# Patient Record
Sex: FEMALE | Race: OTHER | NOT HISPANIC OR LATINO
[De-identification: names, ages, dates, MRNs, and addresses within clinical notes are randomized per-mention and may not be internally consistent; named-entity substitution may affect disease eponyms.]

---

## 2020-12-14 ENCOUNTER — APPOINTMENT (OUTPATIENT)
Dept: RHEUMATOLOGY | Facility: CLINIC | Age: 50
End: 2020-12-14
Payer: COMMERCIAL

## 2020-12-14 VITALS
BODY MASS INDEX: 26.52 KG/M2 | TEMPERATURE: 97.2 F | WEIGHT: 165 LBS | OXYGEN SATURATION: 99 % | SYSTOLIC BLOOD PRESSURE: 127 MMHG | RESPIRATION RATE: 16 BRPM | HEART RATE: 68 BPM | DIASTOLIC BLOOD PRESSURE: 84 MMHG | HEIGHT: 66 IN

## 2020-12-14 DIAGNOSIS — G43.709 CHRONIC MIGRAINE W/OUT AURA, NOT INTRACTABLE, W/OUT STATUS MIGRAINOSUS: ICD-10-CM

## 2020-12-14 DIAGNOSIS — R42 DIZZINESS AND GIDDINESS: ICD-10-CM

## 2020-12-14 DIAGNOSIS — M25.50 PAIN IN UNSPECIFIED JOINT: ICD-10-CM

## 2020-12-14 DIAGNOSIS — E11.9 TYPE 2 DIABETES MELLITUS W/OUT COMPLICATIONS: ICD-10-CM

## 2020-12-14 DIAGNOSIS — Z78.9 OTHER SPECIFIED HEALTH STATUS: ICD-10-CM

## 2020-12-14 DIAGNOSIS — R79.89 OTHER SPECIFIED ABNORMAL FINDINGS OF BLOOD CHEMISTRY: ICD-10-CM

## 2020-12-14 DIAGNOSIS — I10 ESSENTIAL (PRIMARY) HYPERTENSION: ICD-10-CM

## 2020-12-14 DIAGNOSIS — J45.20 MILD INTERMITTENT ASTHMA, UNCOMPLICATED: ICD-10-CM

## 2020-12-14 DIAGNOSIS — R13.10 DYSPHAGIA, UNSPECIFIED: ICD-10-CM

## 2020-12-14 DIAGNOSIS — E78.5 HYPERLIPIDEMIA, UNSPECIFIED: ICD-10-CM

## 2020-12-14 PROBLEM — Z00.00 ENCOUNTER FOR PREVENTIVE HEALTH EXAMINATION: Status: ACTIVE | Noted: 2020-12-14

## 2020-12-14 PROCEDURE — 99204 OFFICE O/P NEW MOD 45 MIN: CPT

## 2020-12-14 PROCEDURE — 99072 ADDL SUPL MATRL&STAF TM PHE: CPT

## 2020-12-14 RX ORDER — LISINOPRIL 10 MG/1
10 TABLET ORAL DAILY
Qty: 30 | Refills: 2 | Status: ACTIVE | COMMUNITY
Start: 2020-12-14

## 2020-12-14 RX ORDER — METFORMIN HYDROCHLORIDE 500 MG/1
500 TABLET, COATED ORAL
Qty: 60 | Refills: 0 | Status: ACTIVE | COMMUNITY
Start: 2020-12-14

## 2020-12-14 RX ORDER — ALBUTEROL SULFATE 2.5 MG/3ML
(2.5 MG/3ML) SOLUTION RESPIRATORY (INHALATION)
Refills: 0 | Status: ACTIVE | COMMUNITY
Start: 2020-12-14

## 2020-12-14 RX ORDER — COLD-HOT PACK
125 MCG EACH MISCELLANEOUS DAILY
Qty: 30 | Refills: 2 | Status: ACTIVE | COMMUNITY
Start: 2020-12-14 | End: 1900-01-01

## 2020-12-14 RX ORDER — SITAGLIPTIN 100 MG/1
100 TABLET, FILM COATED ORAL DAILY
Refills: 0 | Status: ACTIVE | COMMUNITY
Start: 2020-12-14

## 2020-12-14 RX ORDER — EZETIMIBE 10 MG/1
10 TABLET ORAL
Refills: 0 | Status: ACTIVE | COMMUNITY
Start: 2020-12-14

## 2020-12-18 LAB
25(OH)D3 SERPL-MCNC: 37.2 NG/ML
ALBUMIN SERPL ELPH-MCNC: 4.8 G/DL
ALP BLD-CCNC: 102 U/L
ALT SERPL-CCNC: 25 U/L
ANA SER IF-ACNC: NEGATIVE
ANION GAP SERPL CALC-SCNC: 13 MMOL/L
AST SERPL-CCNC: 16 U/L
BASOPHILS # BLD AUTO: 0.03 K/UL
BASOPHILS NFR BLD AUTO: 0.4 %
BILIRUB SERPL-MCNC: 0.6 MG/DL
BUN SERPL-MCNC: 23 MG/DL
CALCIUM SERPL-MCNC: 11 MG/DL
CCP AB SER IA-ACNC: <8 UNITS
CHLORIDE SERPL-SCNC: 102 MMOL/L
CK SERPL-CCNC: 79 U/L
CO2 SERPL-SCNC: 29 MMOL/L
CREAT SERPL-MCNC: 1.21 MG/DL
CRP SERPL-MCNC: 2.24 MG/DL
DEPRECATED KAPPA LC FREE/LAMBDA SER: 0.99 RATIO
ENA SS-A AB SER IA-ACNC: <0.2 AL
ENA SS-B AB SER IA-ACNC: <0.2 AL
EOSINOPHIL # BLD AUTO: 0.06 K/UL
EOSINOPHIL NFR BLD AUTO: 0.7 %
ERYTHROCYTE [SEDIMENTATION RATE] IN BLOOD BY WESTERGREN METHOD: 33 MM/HR
ESTIMATED AVERAGE GLUCOSE: 143 MG/DL
GLUCOSE SERPL-MCNC: 143 MG/DL
HBA1C MFR BLD HPLC: 6.6 %
HCT VFR BLD CALC: 45 %
HGB BLD-MCNC: 14.6 G/DL
IGA SER QL IEP: 415 MG/DL
IGG SER QL IEP: 1075 MG/DL
IGM SER QL IEP: 163 MG/DL
IMM GRANULOCYTES NFR BLD AUTO: 0.2 %
KAPPA LC CSF-MCNC: 2.07 MG/DL
KAPPA LC SERPL-MCNC: 2.05 MG/DL
LYMPHOCYTES # BLD AUTO: 2.77 K/UL
LYMPHOCYTES NFR BLD AUTO: 34.2 %
MAN DIFF?: NORMAL
MCHC RBC-ENTMCNC: 29.9 PG
MCHC RBC-ENTMCNC: 32.4 GM/DL
MCV RBC AUTO: 92.2 FL
MONOCYTES # BLD AUTO: 0.43 K/UL
MONOCYTES NFR BLD AUTO: 5.3 %
NEUTROPHILS # BLD AUTO: 4.79 K/UL
NEUTROPHILS NFR BLD AUTO: 59.2 %
PLATELET # BLD AUTO: 306 K/UL
POTASSIUM SERPL-SCNC: 4.2 MMOL/L
PROT SERPL-MCNC: 7.7 G/DL
RBC # BLD: 4.88 M/UL
RBC # FLD: 13.3 %
RF+CCP IGG SER-IMP: NEGATIVE
RHEUMATOID FACT SER QL: 11 IU/ML
SODIUM SERPL-SCNC: 144 MMOL/L
THYROGLOB AB SERPL-ACNC: <20 IU/ML
THYROPEROXIDASE AB SERPL IA-ACNC: 13 IU/ML
WBC # FLD AUTO: 8.1 K/UL

## 2020-12-21 LAB — HLA-B27 RELATED AG QL: NEGATIVE

## 2021-02-08 ENCOUNTER — APPOINTMENT (OUTPATIENT)
Dept: RHEUMATOLOGY | Facility: CLINIC | Age: 51
End: 2021-02-08

## 2021-02-08 NOTE — REVIEW OF SYSTEMS
[Fever] : no fever [Chills] : no chills [Feeling Poorly] : not feeling poorly [Feeling Tired] : feeling tired [Recent Weight Gain (___ Lbs)] : no recent weight gain [Recent Weight Loss (___ Lbs)] : no recent weight loss [Eye Pain] : no eye pain [Red Eyes] : eyes not red [Eyesight Problems] : eyesight problems [Discharge From Eyes] : no purulent discharge from the eyes [Dry Eyes] : dryness of the eyes [Eyes Itch] : no itching of the eyes [Arthralgias] : arthralgias [Joint Pain] : joint pain [Joint Swelling] : joint swelling [Joint Stiffness] : joint stiffness [Limb Pain] : no limb pain [Limb Swelling] : no limb swelling [As Noted in HPI] : as noted in HPI [Skin Lesions] : skin lesion [Skin Wound] : no skin wound [Itching] : no itching [Change In A Mole] : no change in a mole [Breast Pain] : no breast pain [Breast Lump] : no breast lump [Negative] : Heme/Lymph

## 2021-02-08 NOTE — PHYSICAL EXAM
[General Appearance - Alert] : alert [General Appearance - In No Acute Distress] : in no acute distress [Sclera] : the sclera and conjunctiva were normal [PERRL With Normal Accommodation] : pupils were equal in size, round, and reactive to light [Extraocular Movements] : extraocular movements were intact [Outer Ear] : the ears and nose were normal in appearance [Oropharynx] : the oropharynx was normal [Neck Appearance] : the appearance of the neck was normal [Neck Cervical Mass (___cm)] : no neck mass was observed [Jugular Venous Distention Increased] : there was no jugular-venous distention [Thyroid Diffuse Enlargement] : the thyroid was not enlarged [Thyroid Nodule] : there were no palpable thyroid nodules [] : no respiratory distress [Auscultation Breath Sounds / Voice Sounds] : lungs were clear to auscultation bilaterally [Heart Rate And Rhythm] : heart rate was normal and rhythm regular [Heart Sounds] : normal S1 and S2 [Heart Sounds Gallop] : no gallops [Murmurs] : no murmurs [Heart Sounds Pericardial Friction Rub] : no pericardial rub [Full Pulse] : the pedal pulses are present [Edema] : there was no peripheral edema [Cervical Lymph Nodes Enlarged Posterior Bilaterally] : posterior cervical [Cervical Lymph Nodes Enlarged Anterior Bilaterally] : anterior cervical [Supraclavicular Lymph Nodes Enlarged Bilaterally] : supraclavicular [Axillary Lymph Nodes Enlarged Bilaterally] : axillary [FreeTextEntry1] : Two scaly circumscribed areas on the distal shin  [No Focal Deficits] : no focal deficits [Oriented To Time, Place, And Person] : oriented to person, place, and time [Impaired Insight] : insight and judgment were intact [Affect] : the affect was normal

## 2021-02-08 NOTE — ASSESSMENT
[FreeTextEntry1] : 49 year old female here for evaluation of arthralgias \par \par 1. Arthralgias: likely has FBM and OA as well as diabetic arthropathy given R middle digit trigger finger. However, will also evaluate for RA since she has some inflammatory joint symptoms with > 1 hour of morning symptoms and subjective swelling in the MCP joints. Labs ordered today. Consider hands, wrists and C-spine x-rays in the future. \par \par 2. Dysphagia: f/u EGD and barium swallow studies \par \par 3. Diabetes: A1C ordered today. \par \par 4. Rash: appears to be dermatitis rather than psoriasis. No other areas of rashes or skin lesions. Monitor for now. \par \par 5. Bone health: Ca+D as needed. DEXA not needed at this time. \par \par Follow up in 6 weeks

## 2021-02-08 NOTE — HISTORY OF PRESENT ILLNESS
[FreeTextEntry1] : 49 year old female here for evaluation of joint pains\par \par When she was 28 years old she was told that she may have RA. Had joint pains in the hands a/w morning stiffness. Has type 2 DM, HTN and dyslipidemia. 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 \par \par Currently c/o severe pain in the hand joints - MCPs and PIPs and particularly in the R middle finger - received cortisone shot in that finger for trigger finger from the hand surgeon (helped a little but not a lot). They do swell from time to time. Also has pain in the knees but no swelling. Cold and rainy weather makes symptoms worse. Has psoriasis in the distal shin of the R leg (does not get better with topical therapy). No other skin rashes or lesions. Noticing some difficulty swallowing with choking on solids and liquids - EGD and barium swallow pending. Also c/o neck pain radiating into the shoulders as well as into the occiput. Sometimes gets low back pain as well but not as bad as her neck pain. H/o migraines for many years - feels that they are getting worse. Has vertigo attacks as well. \par \par Has chronic dry eyes + \par \par HCM: Colonoscopy: pending. Mammogram: last done 2 years ago. PAP: normal\par \par Ob/Gyn H: A2 -  one spontaneous  was at 6 weeks and the second was an elective  due to severe fibroid uterus. Perimenopausal - stopped 5 months ago. \par

## 2021-03-02 ENCOUNTER — APPOINTMENT (OUTPATIENT)
Dept: RHEUMATOLOGY | Facility: CLINIC | Age: 51
End: 2021-03-02

## 2021-05-14 ENCOUNTER — APPOINTMENT (OUTPATIENT)
Dept: ENDOCRINOLOGY | Facility: CLINIC | Age: 51
End: 2021-05-14

## 2021-10-06 PROBLEM — I10 ESSENTIAL HYPERTENSION: Status: ACTIVE | Noted: 2020-12-14

## 2022-01-21 ENCOUNTER — EMERGENCY (EMERGENCY)
Facility: HOSPITAL | Age: 52
LOS: 1 days | Discharge: HOME | End: 2022-01-21
Attending: EMERGENCY MEDICINE | Admitting: EMERGENCY MEDICINE
Payer: COMMERCIAL

## 2022-01-21 VITALS
HEART RATE: 90 BPM | OXYGEN SATURATION: 99 % | DIASTOLIC BLOOD PRESSURE: 93 MMHG | TEMPERATURE: 98 F | SYSTOLIC BLOOD PRESSURE: 151 MMHG | RESPIRATION RATE: 18 BRPM

## 2022-01-21 VITALS
TEMPERATURE: 99 F | RESPIRATION RATE: 18 BRPM | SYSTOLIC BLOOD PRESSURE: 147 MMHG | HEART RATE: 90 BPM | DIASTOLIC BLOOD PRESSURE: 78 MMHG | OXYGEN SATURATION: 98 %

## 2022-01-21 PROCEDURE — 99284 EMERGENCY DEPT VISIT MOD MDM: CPT

## 2022-01-21 PROCEDURE — 71045 X-RAY EXAM CHEST 1 VIEW: CPT | Mod: 26

## 2022-01-21 RX ORDER — SENNA PLUS 8.6 MG/1
2 TABLET ORAL
Qty: 14 | Refills: 0
Start: 2022-01-21 | End: 2022-01-27

## 2022-01-21 RX ORDER — ALBUTEROL 90 UG/1
2 AEROSOL, METERED ORAL ONCE
Refills: 0 | Status: COMPLETED | OUTPATIENT
Start: 2022-01-21 | End: 2022-01-21

## 2022-01-21 RX ADMIN — ALBUTEROL 2 PUFF(S): 90 AEROSOL, METERED ORAL at 20:35

## 2022-01-21 NOTE — ED ADULT NURSE NOTE - OBJECTIVE STATEMENT
Pt A&Ox4, ambulatory with assistive device. Pmhx" partial blindness, htn, DM, asthma. Pt [id number] being covid + for 5 days with worsening symptoms. PT c/o dry cough/pleuritic pain with cough, nausea, decrease appetite. Pt denies; SOB, CP, radiating chest pain, dizziness, vomiting, fever/chills, diarrhea.

## 2022-01-21 NOTE — ED PROVIDER NOTE - ATTENDING CONTRIBUTION TO CARE
Patient is a 50 yo F with history of NIDDM, HTN, hyperlipidemia, asthma here for evaluation of coughing in setting of positive COVID infection. Patient reports she has had a cough since Monday. She has been using albuterol once a day, has been using Mucinex without relief. She went to urgent care and was told she has some crackles in her right lung. No fevers, vomiting. Denies chest pain, leg swelling. No nausea, vomiting or diarrhea. She is able to eat and drink. She is a non-smoker. No recent travel. Patient is unvaccinated, has never had COVID before. She is worried about her cough and her lungs.     VS noted O2 sat %  Gen. no acute distress, Non toxic   HEENT: EOMI, mmm  Lungs: CTAB/L no C/ W /R   CVS: RRR   Abd; Soft non tender, non distended   Ext: no edema  Skin: no rash  Neuro AAOx3 non focal clear speech  a/p: coughing - lungs are CTA. Patient is only using her inhaler once a day. Plan for CXR, albuterol, FS. She is not hypoxic, is tolerating PO and lungs are CTA. Likely d/c home with instructions to take albuterol q 4 hours and strict return precautions.   - Andria PATE

## 2022-01-21 NOTE — ED PROVIDER NOTE - PROGRESS NOTE DETAILS
PA RUCHI: Patient reassessed, sitting comfortably in bed in NAD, denies any complaints. CXR no emergent finding. Discussed with attending, patient can be discharged home to f/u with PCP. The patient was given verbal and written discharge instructions. Specifically, instructions when to return to the ED and when to seek follow-up from their pcp was discussed. Any specialty follow-up was discussed, including how to make an appointment.  Instructions were discussed in simple, plain language and was understood by the patient. The patient understands that should their symptoms worsen or any new symptoms arise, they should return to the ED immediately for further evaluation. All pt's questions were answered. Patient verbalizes understanding.

## 2022-01-21 NOTE — ED PROVIDER NOTE - NSFOLLOWUPINSTRUCTIONS_ED_ALL_ED_FT
You were seen and evaluated for infection of COVID 19.     Take Tylenol 650 mg every 6 hours as needed for fever or pain.     You should stay hydrated and increase the amount of fluid you drink.    Return to the Emergency Department if your shortness of breath becomes worse, you become weak, or new symptoms develop.    You should monitor your temperature and quarantine yourself away from others - particularly the elderly, ill, or immunosuppressed - as per CDC guidelines. You were seen and evaluated for infection of COVID 19.     Take Tylenol 650 mg every 6 hours as needed for fever or pain.     Use Albuterol Inhaler 2 puffs every 4 to 6 hours as needed for shortness of breath and/or wheezing.     Take Senna as directed for constipation.     You should stay hydrated and increase the amount of fluid you drink.    Return to the Emergency Department if your shortness of breath becomes worse, you become weak, or new symptoms develop.    You should monitor your temperature and quarantine yourself away from others - particularly the elderly, ill, or immunosuppressed - as per CDC guidelines.

## 2022-01-21 NOTE — ED PROVIDER NOTE - OBJECTIVE STATEMENT
50 yo F with PMH of NIDDM, HTN, HLD, asthma, presents to ER c/o +COVID w/ dry coughing since Monday. Reports persistent coughing a/w generalized weakness, taking Mucinex and using Albuterol once a day w/o much improvement. Admits she's unvaccinated, tested +COVID on Monday. Admits some constipation. Denies any fever, chest pain, sob, abdominal pain, n/v/d, dysuria, leg swelling, recent travel, or any other complaints.

## 2022-01-21 NOTE — ED ADULT NURSE NOTE - NSIMPLEMENTINTERV_GEN_ALL_ED
Implemented All Fall Risk Interventions:  [place] to call system. Call bell, personal items and telephone within reach. Instruct patient to call for assistance. Room bathroom lighting operational. Non-slip footwear when patient is off stretcher. Physically safe environment: no spills, clutter or unnecessary equipment. Stretcher in lowest position, wheels locked, appropriate side rails in place. Provide visual cue, wrist band, yellow gown, etc. Monitor gait and stability. Monitor for mental status changes and reorient to person, place, and time. Review medications for side effects contributing to fall risk. Reinforce activity limits and safety measures with patient and family.

## 2022-01-21 NOTE — ED PROVIDER NOTE - MUSCULOSKELETAL, MLM
Spine appears normal, range of motion is not limited, no muscle or joint tenderness; no leg swelling b/l; no calf tenderness b/l.

## 2022-01-21 NOTE — ED ADULT TRIAGE NOTE - CHIEF COMPLAINT QUOTE
Pt states that she tested positive for Covid on Monday, states that she is feeling fatigued and is still coughing despite taking mucinex.  Pt in no resp distress well appearing.  PMH HTN DM, vision impaired.  Pt is not vaccinated

## 2022-01-21 NOTE — ED PROVIDER NOTE - NSICDXPASTMEDICALHX_GEN_ALL_CORE_FT
PAST MEDICAL HISTORY:  Asthma     DM (diabetes mellitus)     HLD (hyperlipidemia)     HTN (hypertension)

## 2022-01-21 NOTE — ED PROVIDER NOTE - PATIENT PORTAL LINK FT
You can access the FollowMyHealth Patient Portal offered by [facility] by registering at the following website: [email or web address]. By joining [facility]’s FollowMyHealth portal, you will also be able to view your health information using other applications (apps) compatible with our system.

## 2022-01-21 NOTE — ED PROVIDER NOTE - CLINICAL SUMMARY MEDICAL DECISION MAKING FREE TEXT BOX
50 yo F with PMH of NIDDM, HTN, HLD, asthma, presents to ER c/o +COVID w/ dry coughing since Monday. well appearing female. afebrile. no respiratory distress. no hypoxia. lower concern for COVID pneumonia. Plan to obtain CXR, FS, Albuterol inhaler w/ spacer and reassess. 52 yo F with PMH of NIDDM, HTN, HLD, asthma, presents to ER c/o +COVID w/ dry coughing since Monday. well appearing female. afebrile. no respiratory distress. no hypoxia. lower concern for COVID pneumonia. Plan to obtain CXR, FS, Albuterol inhaler w/ spacer and reassess.    Andria PATE: coughing - lungs are CTA. Patient is only using her inhaler once a day. Plan for CXR, albuterol, FS. She is not hypoxic, is tolerating PO and lungs are CTA. Likely d/c home with instructions to take albuterol q 4 hours and strict return precautions.

## 2022-02-05 ENCOUNTER — TRANSCRIPTION ENCOUNTER (OUTPATIENT)
Age: 52
End: 2022-02-05